# Patient Record
Sex: MALE | Race: WHITE | URBAN - METROPOLITAN AREA
[De-identification: names, ages, dates, MRNs, and addresses within clinical notes are randomized per-mention and may not be internally consistent; named-entity substitution may affect disease eponyms.]

---

## 2017-01-30 ENCOUNTER — TRANSFERRED RECORDS (OUTPATIENT)
Dept: HEALTH INFORMATION MANAGEMENT | Facility: CLINIC | Age: 57
End: 2017-01-30

## 2017-03-24 ENCOUNTER — TELEPHONE (OUTPATIENT)
Dept: TRANSPLANT | Facility: CLINIC | Age: 57
End: 2017-03-24

## 2017-03-24 NOTE — TELEPHONE ENCOUNTER
Rd calling with request for small refill of mycophenolate and rapamune, from [facility] pharmacy in [place].   He travelled there and forgot these medications.  Call made to [facility] pharmacy at [address] [place] [address].   For mycophenolate 250mg BID (5 tabs) and rapamune 3mg (6 tabs)

## 2017-08-18 ENCOUNTER — TELEPHONE (OUTPATIENT)
Dept: TRANSPLANT | Facility: CLINIC | Age: 57
End: 2017-08-18

## 2017-08-18 NOTE — TELEPHONE ENCOUNTER
Per :   Pt hospitalized in [place], [place], with dehydration, diarrhea.  Per , pt would like to officially transfer his care here for his transplant f/u.  Requesting to be seen in clinic for f/u post hospitalization.

## 2017-08-18 NOTE — TELEPHONE ENCOUNTER
Pt sent email requesting to come out to be seen, now out of the hospital.  He is asking to see both  and , same day.  This request was sent to 's , who reports that  is away until after Labor day.    Plan request appt with , inform 's  of 's appt with pt.

## 2017-08-24 DIAGNOSIS — Z94.4 HISTORY OF LIVER TRANSPLANT (H): Primary | ICD-10-CM

## 2017-08-24 NOTE — TELEPHONE ENCOUNTER
Per pt please send prescription for azathioprine to:    100mg  Take 1 tab daily,   Discontinue cellcept when starts azathioprine.    [facility]  [address] [place] [address]  p: [phone]

## 2017-08-24 NOTE — TELEPHONE ENCOUNTER
Per  re: pt's immunosuppression.   Pt hospitalized with diarrhea, dehydration.  Plan for pt to discontinue cellcept, start azathioprine 100mg daily.  Continue on same dose of sirolimus.

## 2017-08-25 RX ORDER — AZATHIOPRINE 100 MG/1
100 TABLET ORAL DAILY
Qty: 90 TABLET | Refills: 3
Start: 2017-08-25 | End: 2018-08-14

## 2017-08-31 DIAGNOSIS — Z94.4 LIVER REPLACED BY TRANSPLANT (H): Primary | ICD-10-CM

## 2017-09-11 DIAGNOSIS — Z94.4 LIVER REPLACED BY TRANSPLANT (H): ICD-10-CM

## 2017-09-11 RX ORDER — SIROLIMUS 1 MG/1
3 TABLET, FILM COATED ORAL DAILY
Qty: 270 TABLET | Refills: 3 | Status: SHIPPED | OUTPATIENT
Start: 2017-09-11 | End: 2017-09-29

## 2017-09-29 ENCOUNTER — TELEPHONE (OUTPATIENT)
Dept: TRANSPLANT | Facility: CLINIC | Age: 57
End: 2017-09-29

## 2017-09-29 DIAGNOSIS — Z94.4 LIVER REPLACED BY TRANSPLANT (H): ICD-10-CM

## 2017-09-29 RX ORDER — SIROLIMUS 1 MG/1
3 TABLET, FILM COATED ORAL DAILY
Qty: 270 TABLET | Refills: 3 | Status: SHIPPED | OUTPATIENT
Start: 2017-09-29 | End: 2018-09-27

## 2017-09-29 NOTE — LETTER
OUTPATIENT OR TRANSITIONAL CARE   LABORATORY TEST ORDER     Patient Name: Scar Landers  Transplant Date: 11/22/2005   YOB: 1960  Issue Date: September 29, 2017   [facility] MR#: [id number]  Expiration Date:   September 29, 2018        Diagnoses: [x]      Liver Transplant (ICD-10 Z94.4)    [x]      Long term use of medications (ICD-10 Z79.899)     Please fax results to [phone]    Frequency: every 2 months and PRN         [x]      CBC with Platelets   [x]      Basic Metabolic Panel (Sodium, Potassium, Chloride, CO2, Creatinine, Urea Nitrogen, Glucose, Calcium)  [x]      Hepatic panel (Albumin, Alk Phos, ALT, AST, LDH, Direct and Total Bili)  [x]      Tacrolimus drug level  - 24 hour trough, please document time of last dose    Other Frequency: annually    [x]      Fasting lipid panel  [x]      Random urine protein  [x]      Urinalysis with microscopic    If you have any questions, please call [facility]- [phone] or [phone], Fax- [phone].  .

## 2017-09-29 NOTE — TELEPHONE ENCOUNTER
Pt contacting about not having refill of sirolimus, ordered 9/11,  Discovered that pt's mail order pharmacy is now [facility], which merged with [facility] (previous script there).  Each company uses different computer system.    Called and resent prescription to [facility].    Pt requesting updated lab orders.

## 2017-10-04 ENCOUNTER — TELEPHONE (OUTPATIENT)
Dept: GASTROENTEROLOGY | Facility: CLINIC | Age: 57
End: 2017-10-04

## 2017-10-04 NOTE — TELEPHONE ENCOUNTER
Left message for pt reminding them of upcoming appointment.  Instructed pt to bring updated medications list.  Patient instructed to arrive early for check-in  Dio Bianchi CMA

## 2017-10-06 ENCOUNTER — OFFICE VISIT (OUTPATIENT)
Dept: TRANSPLANT | Facility: CLINIC | Age: 57
End: 2017-10-06
Attending: TRANSPLANT SURGERY
Payer: COMMERCIAL

## 2017-10-06 ENCOUNTER — OFFICE VISIT (OUTPATIENT)
Dept: GASTROENTEROLOGY | Facility: CLINIC | Age: 57
End: 2017-10-06
Attending: INTERNAL MEDICINE
Payer: COMMERCIAL

## 2017-10-06 VITALS
DIASTOLIC BLOOD PRESSURE: 75 MMHG | SYSTOLIC BLOOD PRESSURE: 110 MMHG | HEART RATE: 65 BPM | HEIGHT: 72 IN | BODY MASS INDEX: 22.17 KG/M2 | TEMPERATURE: 98.3 F | OXYGEN SATURATION: 100 % | RESPIRATION RATE: 16 BRPM | WEIGHT: 163.7 LBS

## 2017-10-06 VITALS
WEIGHT: 163.7 LBS | SYSTOLIC BLOOD PRESSURE: 110 MMHG | HEIGHT: 72 IN | BODY MASS INDEX: 22.17 KG/M2 | OXYGEN SATURATION: 100 % | DIASTOLIC BLOOD PRESSURE: 75 MMHG | TEMPERATURE: 97.6 F | HEART RATE: 65 BPM

## 2017-10-06 DIAGNOSIS — Z94.4 LIVER REPLACED BY TRANSPLANT (H): Primary | ICD-10-CM

## 2017-10-06 DIAGNOSIS — Z94.4 LIVER REPLACED BY TRANSPLANT (H): ICD-10-CM

## 2017-10-06 LAB
ALBUMIN SERPL-MCNC: 3.4 G/DL (ref 3.4–5)
ALBUMIN UR-MCNC: 30 MG/DL
ALP SERPL-CCNC: 120 U/L (ref 40–150)
ALT SERPL W P-5'-P-CCNC: 28 U/L (ref 0–70)
ANION GAP SERPL CALCULATED.3IONS-SCNC: 4 MMOL/L (ref 3–14)
APPEARANCE UR: ABNORMAL
AST SERPL W P-5'-P-CCNC: 25 U/L (ref 0–45)
BILIRUB DIRECT SERPL-MCNC: <0.1 MG/DL (ref 0–0.2)
BILIRUB SERPL-MCNC: 0.5 MG/DL (ref 0.2–1.3)
BILIRUB UR QL STRIP: NEGATIVE
BUN SERPL-MCNC: 24 MG/DL (ref 7–30)
CALCIUM SERPL-MCNC: 8.1 MG/DL (ref 8.5–10.1)
CHLORIDE SERPL-SCNC: 106 MMOL/L (ref 94–109)
CHOLEST SERPL-MCNC: 143 MG/DL
CO2 SERPL-SCNC: 29 MMOL/L (ref 20–32)
COLOR UR AUTO: YELLOW
CREAT SERPL-MCNC: 1.5 MG/DL (ref 0.66–1.25)
CREAT UR-MCNC: 170 MG/DL
ERYTHROCYTE [DISTWIDTH] IN BLOOD BY AUTOMATED COUNT: 15.9 % (ref 10–15)
GFR SERPL CREATININE-BSD FRML MDRD: 48 ML/MIN/1.7M2
GLUCOSE SERPL-MCNC: 91 MG/DL (ref 70–99)
GLUCOSE UR STRIP-MCNC: NEGATIVE MG/DL
HCT VFR BLD AUTO: 35 % (ref 40–53)
HDLC SERPL-MCNC: 64 MG/DL
HGB BLD-MCNC: 11.1 G/DL (ref 13.3–17.7)
HGB UR QL STRIP: NEGATIVE
HYALINE CASTS #/AREA URNS LPF: 1 /LPF (ref 0–2)
KETONES UR STRIP-MCNC: NEGATIVE MG/DL
LDLC SERPL CALC-MCNC: 53 MG/DL
LEUKOCYTE ESTERASE UR QL STRIP: NEGATIVE
MCH RBC QN AUTO: 28.2 PG (ref 26.5–33)
MCHC RBC AUTO-ENTMCNC: 31.7 G/DL (ref 31.5–36.5)
MCV RBC AUTO: 89 FL (ref 78–100)
MUCOUS THREADS #/AREA URNS LPF: PRESENT /LPF
NITRATE UR QL: NEGATIVE
NONHDLC SERPL-MCNC: 79 MG/DL
PH UR STRIP: 5 PH (ref 5–7)
PLATELET # BLD AUTO: 279 10E9/L (ref 150–450)
POTASSIUM SERPL-SCNC: 4 MMOL/L (ref 3.4–5.3)
PROT SERPL-MCNC: 7 G/DL (ref 6.8–8.8)
PROT UR-MCNC: 0.48 G/L
PROT/CREAT 24H UR: 0.28 G/G CR (ref 0–0.2)
RBC # BLD AUTO: 3.94 10E12/L (ref 4.4–5.9)
RBC #/AREA URNS AUTO: 1 /HPF (ref 0–2)
SIROLIMUS BLD-MCNC: 5.2 UG/L (ref 5–15)
SODIUM SERPL-SCNC: 139 MMOL/L (ref 133–144)
SOURCE: ABNORMAL
SP GR UR STRIP: 1.02 (ref 1–1.03)
TME LAST DOSE: NORMAL H
TRIGL SERPL-MCNC: 130 MG/DL
UROBILINOGEN UR STRIP-MCNC: 0 MG/DL (ref 0–2)
WBC # BLD AUTO: 7.1 10E9/L (ref 4–11)
WBC #/AREA URNS AUTO: <1 /HPF (ref 0–2)

## 2017-10-06 PROCEDURE — G0008 ADMIN INFLUENZA VIRUS VAC: HCPCS | Mod: ZF

## 2017-10-06 PROCEDURE — 80061 LIPID PANEL: CPT | Performed by: INTERNAL MEDICINE

## 2017-10-06 PROCEDURE — 84156 ASSAY OF PROTEIN URINE: CPT | Performed by: INTERNAL MEDICINE

## 2017-10-06 PROCEDURE — 90686 IIV4 VACC NO PRSV 0.5 ML IM: CPT | Mod: ZF | Performed by: TRANSPLANT SURGERY

## 2017-10-06 PROCEDURE — 99211 OFF/OP EST MAY X REQ PHY/QHP: CPT | Mod: 27

## 2017-10-06 PROCEDURE — 99212 OFFICE O/P EST SF 10 MIN: CPT | Mod: ZF

## 2017-10-06 PROCEDURE — 85027 COMPLETE CBC AUTOMATED: CPT | Performed by: INTERNAL MEDICINE

## 2017-10-06 PROCEDURE — 36415 COLL VENOUS BLD VENIPUNCTURE: CPT | Performed by: INTERNAL MEDICINE

## 2017-10-06 PROCEDURE — 80048 BASIC METABOLIC PNL TOTAL CA: CPT | Performed by: INTERNAL MEDICINE

## 2017-10-06 PROCEDURE — 80076 HEPATIC FUNCTION PANEL: CPT | Performed by: INTERNAL MEDICINE

## 2017-10-06 PROCEDURE — 80195 ASSAY OF SIROLIMUS: CPT | Performed by: INTERNAL MEDICINE

## 2017-10-06 PROCEDURE — 81001 URINALYSIS AUTO W/SCOPE: CPT | Performed by: INTERNAL MEDICINE

## 2017-10-06 PROCEDURE — 25000128 H RX IP 250 OP 636: Mod: ZF | Performed by: TRANSPLANT SURGERY

## 2017-10-06 RX ADMIN — INFLUENZA A VIRUS A/MICHIGAN/45/2015 X-275 (H1N1) ANTIGEN (FORMALDEHYDE INACTIVATED), INFLUENZA A VIRUS A/HONG KONG/4801/2014 X-263B (H3N2) ANTIGEN (FORMALDEHYDE INACTIVATED), INFLUENZA B VIRUS B/PHUKET/3073/2013 ANTIGEN (FORMALDEHYDE INACTIVATED), AND INFLUENZA B VIRUS B/BRISBANE/60/2008 ANTIGEN (FORMALDEHYDE INACTIVATED) 0.5 ML: 15; 15; 15; 15 INJECTION, SUSPENSION INTRAMUSCULAR at 12:55

## 2017-10-06 ASSESSMENT — PAIN SCALES - GENERAL: PAINLEVEL: NO PAIN (0)

## 2017-10-06 NOTE — MR AVS SNAPSHOT
After Visit Summary   10/6/2017    Scar Landers    MRN: [id number]           Patient Information     Date Of Birth          1960        Visit Information        Provider Department      10/6/2017 11:45 AM Juarez Ramos MD [facility] Hepatology        Today's Diagnoses     Liver replaced by transplant (H)    -  1       Follow-ups after your visit        Follow-up notes from your care team     Return if symptoms worsen or fail to improve.      Who to contact     If you have questions or need follow up information about today's clinic visit or your schedule please contact [facility] HEPATOLOG[facility] directly at [phone].  Normal or non-critical lab and imaging results will be communicated to you by MyChart, letter or phone within 4 business days after the clinic has received the results. If you do not hear from us within 7 days, please contact the clinic through [employer] or phone. If you have a critical or abnormal lab result, we will notify you by phone as soon as possible.  Submit refill requests through edulio or call your pharmacy and they will forward the refill request to us. Please allow 3 business days for your refill to be completed.          Additional Information About Your Visit        MyChart Information     edulio gives you secure access to your electronic health record. If you see a primary care provider, you can also send messages to your care team and make appointments. If you have questions, please call your primary care clinic.  If you do not have a primary care provider, please call [phone] and they will assist you.        Care EveryWhere ID     This is your Care EveryWhere ID. This could be used by other organizations to access your [facility] medical records  [id number]        Your Vitals Were     Pulse Temperature Height Pulse Oximetry BMI (Body Mass Index)       65 97.6  F (36.4  C) (Oral) 1.829 m (6') 100% 22.2 kg/m2        Blood Pressure from Last 3 Encounters:   10/06/17  110/75   10/06/17 110/75   05/09/14 119/76    Weight from Last 3 Encounters:   10/06/17 74.3 kg (163 lb 11.2 oz)   10/06/17 74.3 kg (163 lb 11.2 oz)   05/09/14 74.8 kg (164 lb 14.4 oz)              Today, you had the following     No orders found for display       Primary Care Provider Office Phone # Fax #    Frank CHUN Uriel [phone]       TRI DOLAN [address] [facility] [address]  [facility] [address]        Equal Access to Services     [facility]: Hadii aad ku hadasho Soomaali, waaxda luqadaha, qaybta kaalmada adeegyada, waxpiper saucedoin hayaan adenestor neff . So [facility] [phone].    ATENCIÓN: Si habla español, tiene a lara disposición servicios gratuitos de asistencia lingüística. Ll[facility] [phone].    We comply with applicable federal civil rights laws and [place] laws. We do not discriminate on the basis of race, color, national origin, age, disability, sex, sexual orientation, or gender identity.            Thank you!     Thank you for choosing [facility] HEPATOLOG[facility]  for your care. Our goal is always to provide you with excellent care. Hearing back from our patients is one way we can continue to improve our services. Please take a few minutes to complete the written survey that you may receive in the mail after your visit with us. Thank you!             Your Updated Medication List - Protect others around you: Learn how to safely use, store and throw away your medicines at www.disposemymeds.org.          This list is accurate as of: 10/6/17 11:59 PM.  Always use your most recent med list.                   Brand Name Dispense Instructions for use Diagnosis    allopurinol 100 MG tablet    ZYLOPRIM    180 tablet    Take 2 tablets (200 mg) by mouth daily    Acute gouty arthritis, Status post liver transplantation (H)       atorvastatin 10 MG tablet    LIPITOR     Take 10 mg by mouth daily Take 1/2 tab daily        azaTHIOprine 100 MG Tabs     90 tablet    Take 100 mg by mouth daily     History of liver transplant (H)       colchicine 0.6 MG tablet      Take 2 tablets by mouth daily        losartan-hydrochlorothiazide 50-12.5 MG per tablet    HYZAAR     Take 1 tablet by mouth daily        sirolimus 1 MG tablet    GENERIC EQUIVALENT    270 tablet    Take 3 tablets (3 mg) by mouth daily Express ID # [id number]    Liver replaced by transplant (H)       STRESS TAB NF PO      Take by mouth daily as needed

## 2017-10-06 NOTE — NURSING NOTE
Chief Complaint   Patient presents with     Liver Transplant       Initial /75  Pulse 65  Temp 98.3  F (36.8  C) (Oral)  Resp 16  Ht 1.829 m (6')  Wt 74.3 kg (163 lb 11.2 oz)  SpO2 100%  BMI 22.2 kg/m2 Estimated body mass index is 22.2 kg/(m^2) as calculated from the following:    Height as of this encounter: 1.829 m (6').    Weight as of this encounter: 74.3 kg (163 lb 11.2 oz).

## 2017-10-06 NOTE — MR AVS SNAPSHOT
After Visit Summary   10/6/2017    Scar Landers    MRN: [id number]           Patient Information     Date Of Birth          1960        Visit Information        Provider Department      10/6/2017 11:15 AM Larry Vigil MD [facility] Solid Organ Transplant        Today's Diagnoses     Liver replaced by transplant (H)    -  1       Follow-ups after your visit        Who to contact     If you have questions or need follow up information about today's clinic visit or your schedule please contact [facility] SOLID ORGAN TRANSPLANT directly at [phone].  Normal or non-critical lab and imaging results will be communicated to you by [employer], letter or phone within 4 business days after the clinic has received the results. If you do not hear from us within 7 days, please contact the clinic through [employer] or phone. If you have a critical or abnormal lab result, we will notify you by phone as soon as possible.  Submit refill requests through [employer] or call your pharmacy and they will forward the refill request to us. Please allow 3 business days for your refill to be completed.          Additional Information About Your Visit        MyChart Information     [employer] gives you secure access to your electronic health record. If you see a primary care provider, you can also send messages to your care team and make appointments. If you have questions, please call your primary care clinic.  If you do not have a primary care provider, please call [phone] and they will assist you.        Care EveryWhere ID     This is your Care EveryWhere ID. This could be used by other organizations to access your [facility] medical records  [id number]        Your Vitals Were     Pulse Temperature Respirations Height Pulse Oximetry BMI (Body Mass Index)    65 98.3  F (36.8  C) (Oral) 16 1.829 m (6') 100% 22.2 kg/m2       Blood Pressure from Last 3 Encounters:   10/06/17 110/75   10/06/17 110/75   05/09/14 119/76     Weight from Last 3 Encounters:   10/06/17 74.3 kg (163 lb 11.2 oz)   10/06/17 74.3 kg (163 lb 11.2 oz)   05/09/14 74.8 kg (164 lb 14.4 oz)              Today, you had the following     No orders found for display       Primary Care Provider Office Phone # Fax #    Frank CHUN Uriel [phone]       TRI DOLAN [address] [facility] [address]  [facility] [address]        Equal Access to Services     [facility]: Hadii aad ku hadasho Soomaali, waaxda luqadaha, qaybta kaalmada adeegyada, waxay idiin hayaan adeeg kharainge neff . So [facility] [phone].    ATENCIÓN: Si habla español, tiene a lara disposición servicios gratuitos de asistencia lingüística. Ll[facility] [phone].    We comply with applicable federal civil rights laws and [place] laws. We do not discriminate on the basis of race, color, national origin, age, disability, sex, sexual orientation, or gender identity.            Thank you!     Thank you for choosing [facility] SOLID ORGAN TRANSPLANT  for your care. Our goal is always to provide you with excellent care. Hearing back from our patients is one way we can continue to improve our services. Please take a few minutes to complete the written survey that you may receive in the mail after your visit with us. Thank you!             Your Updated Medication List - Protect others around you: Learn how to safely use, store and throw away your medicines at www.disposemymeds.org.          This list is accurate as of: 10/6/17 11:59 PM.  Always use your most recent med list.                   Brand Name Dispense Instructions for use Diagnosis    allopurinol 100 MG tablet    ZYLOPRIM    180 tablet    Take 2 tablets (200 mg) by mouth daily    Acute gouty arthritis, Status post liver transplantation (H)       atorvastatin 10 MG tablet    LIPITOR     Take 10 mg by mouth daily Take 1/2 tab daily        azaTHIOprine 100 MG Tabs     90 tablet    Take 100 mg by mouth daily    History of liver transplant (H)        colchicine 0.6 MG tablet      Take 2 tablets by mouth daily        losartan-hydrochlorothiazide 50-12.5 MG per tablet    HYZAAR     Take 1 tablet by mouth daily        sirolimus 1 MG tablet    GENERIC EQUIVALENT    270 tablet    Take 3 tablets (3 mg) by mouth daily Express ID # [id number]    Liver replaced by transplant (H)       STRESS TAB NF PO      Take by mouth daily as needed

## 2017-10-06 NOTE — LETTER
10/6/2017      RE: Scar Landers  [address] [facility] [address]       I had the pleasure of seeing Rd Landers for followup in the Liver Transplantation Clinic at the [facility] on 10/06/2017.  Mr. Sun returns for followup status post liver transplantation for primary sclerosing cholangitis at the [facility].      He is doing quite well at this point in time.  He denies any abdominal pain, itching or skin rash or fatigue.  He denies any increased abdominal girth or lower extremity edema.  He denies any fevers or chills, cough or shortness of breath.  He denies any nausea or vomiting, diarrhea or constipation.  His appetite has been good, and his weight has been stable.  There have been no other new events since he was last seen.       Current Outpatient Prescriptions   Medication     sirolimus (GENERIC EQUIVALENT) 1 MG tablet     azaTHIOprine 100 MG TABS     allopurinol (ZYLOPRIM) 100 MG tablet     losartan-hydrochlorothiazide (HYZAAR) 50-12.5 MG per tablet     colchicine 0.6 MG tablet     atorvastatin (LIPITOR) 10 MG tablet     Multiple Vitamins-Minerals (STRESS TAB NF PO)     No current facility-administered medications for this visit.      B/P: 110/75, T: 97.6, P: 65, R: Data Unavailable    HEENT exam shows no scleral icterus and no temporal muscle wasting.  His chest is clear.  His abdominal exam shows no increase in girth.  No masses or tenderness to palpation are present.  His liver is 10 cm in span without left lobe enlargement.  No spleen tip is palpable, and extremity exam shows no edema.  Skin exam shows no suspicious lesions.  Neurologic exam is nonfocal.      His most recent laboratory tests show his white count is 7.1, hemoglobin is 11.1, platelets are 279,000.  His MCV is 89.  Sodium 139, potassium 4.0, BUN is 24, creatinine 1.5.  AST is 25, ALT is 28, alkaline phosphatase is 120, albumin is 3.4, total protein of 7.0, total bilirubin is 0.5.       My impression is that Mr. Landers is doing very well status post liver transplantation.  He will be updated in terms of his vaccines.  He is up-to-date with regard to cancer screening. I will not be making any change to his medical regimen.  I will to see him back in the clinic again in one year.      Thank you very much for allowing me to participate in the care of this patient.  If you have any questions regarding my recommendations, please do not hesitate to contact me.       Juarez Ramos MD      Professor of Medicine  [employer]      Executive Medical Director, Solid Organ Transplant Program  [facility]

## 2017-10-06 NOTE — LETTER
10/6/2017       RE: Scar Landers  [address] [facility] [address]     Dear Colleague,    Thank you for referring your patient, Scar Landers, to the [facility] SOLID ORGAN TRANSPLANT at [facility]. Please see a copy of my visit note below.    No specific issues. Thinking of transferring care to   from [place].  Have known Mr Landers for many years.  Current Outpatient Prescriptions   Medication     sirolimus (GENERIC EQUIVALENT) 1 MG tablet     azaTHIOprine 100 MG TABS     allopurinol (ZYLOPRIM) 100 MG tablet     losartan-hydrochlorothiazide (HYZAAR) 50-12.5 MG per tablet     colchicine 0.6 MG tablet     atorvastatin (LIPITOR) 10 MG tablet     Multiple Vitamins-Minerals (STRESS TAB NF PO)     No current facility-administered medications for this visit.      No surgical issues at this time.  To see Dr. Ramos.    Again, thank you for allowing me to participate in the care of your patient.      Sincerely,    Larry Vigil MD

## 2017-10-06 NOTE — NURSING NOTE
Chief Complaint   Patient presents with     RECHECK     Post Liver TXP   Pt roomed, vitals, meds, and allergies reviewed with pt. Pt ready for provider.  Dio Bianchi, CMA

## 2017-10-16 NOTE — PROGRESS NOTES
I had the pleasure of seeing Rd Landers for followup in the Liver Transplantation Clinic at the [facility] on 10/06/2017.  Mr. Sun returns for followup status post liver transplantation for primary sclerosing cholangitis at the [facility].      He is doing quite well at this point in time.  He denies any abdominal pain, itching or skin rash or fatigue.  He denies any increased abdominal girth or lower extremity edema.  He denies any fevers or chills, cough or shortness of breath.  He denies any nausea or vomiting, diarrhea or constipation.  His appetite has been good, and his weight has been stable.  There have been no other new events since he was last seen.       Current Outpatient Prescriptions   Medication     sirolimus (GENERIC EQUIVALENT) 1 MG tablet     azaTHIOprine 100 MG TABS     allopurinol (ZYLOPRIM) 100 MG tablet     losartan-hydrochlorothiazide (HYZAAR) 50-12.5 MG per tablet     colchicine 0.6 MG tablet     atorvastatin (LIPITOR) 10 MG tablet     Multiple Vitamins-Minerals (STRESS TAB NF PO)     No current facility-administered medications for this visit.      B/P: 110/75, T: 97.6, P: 65, R: Data Unavailable    HEENT exam shows no scleral icterus and no temporal muscle wasting.  His chest is clear.  His abdominal exam shows no increase in girth.  No masses or tenderness to palpation are present.  His liver is 10 cm in span without left lobe enlargement.  No spleen tip is palpable, and extremity exam shows no edema.  Skin exam shows no suspicious lesions.  Neurologic exam is nonfocal.      His most recent laboratory tests show his white count is 7.1, hemoglobin is 11.1, platelets are 279,000.  His MCV is 89.  Sodium 139, potassium 4.0, BUN is 24, creatinine 1.5.  AST is 25, ALT is 28, alkaline phosphatase is 120, albumin is 3.4, total protein of 7.0, total bilirubin is 0.5.      My impression is that Mr. Landers is doing very well status post liver  transplantation.  He will be updated in terms of his vaccines.  He is up-to-date with regard to cancer screening. I will not be making any change to his medical regimen.  I will to see him back in the clinic again in one year.      Thank you very much for allowing me to participate in the care of this patient.  If you have any questions regarding my recommendations, please do not hesitate to contact me.       Juarez Ramos MD      Professor of Medicine  [employer]      Executive Medical Director, Solid Organ Transplant Program  [facility]

## 2017-10-30 RX ORDER — MYCOPHENOLATE MOFETIL 250 MG/1
CAPSULE ORAL
Qty: 180 CAPSULE | Refills: 3 | OUTPATIENT
Start: 2017-10-30

## 2017-11-02 NOTE — PROGRESS NOTES
No specific issues. Thinking of transferring care to   from [place].  Have known Mr Landers for many years.  Current Outpatient Prescriptions   Medication     sirolimus (GENERIC EQUIVALENT) 1 MG tablet     azaTHIOprine 100 MG TABS     allopurinol (ZYLOPRIM) 100 MG tablet     losartan-hydrochlorothiazide (HYZAAR) 50-12.5 MG per tablet     colchicine 0.6 MG tablet     atorvastatin (LIPITOR) 10 MG tablet     Multiple Vitamins-Minerals (STRESS TAB NF PO)     No current facility-administered medications for this visit.      No surgical issues at this time.  To see Dr. Ramos.

## 2017-12-28 ENCOUNTER — DOCUMENTATION ONLY (OUTPATIENT)
Dept: TRANSPLANT | Facility: CLINIC | Age: 57
End: 2017-12-28

## 2018-08-14 DIAGNOSIS — Z94.4 HISTORY OF LIVER TRANSPLANT (H): ICD-10-CM

## 2018-08-15 RX ORDER — AZATHIOPRINE 100 MG/1
100 TABLET ORAL DAILY
Qty: 90 TABLET | Refills: 3 | Status: SHIPPED | OUTPATIENT
Start: 2018-08-15 | End: 2018-08-15

## 2018-08-15 RX ORDER — AZATHIOPRINE 100 MG/1
100 TABLET ORAL DAILY
Qty: 90 TABLET | Refills: 3 | Status: SHIPPED | OUTPATIENT
Start: 2018-08-15 | End: 2018-11-26

## 2018-09-27 DIAGNOSIS — Z94.4 LIVER REPLACED BY TRANSPLANT (H): ICD-10-CM

## 2018-09-27 RX ORDER — SIROLIMUS 1 MG/1
3 TABLET, FILM COATED ORAL DAILY
Qty: 270 TABLET | Refills: 3 | Status: SHIPPED | OUTPATIENT
Start: 2018-09-27 | End: 2019-06-17

## 2018-11-26 DIAGNOSIS — Z94.4 HISTORY OF LIVER TRANSPLANT (H): ICD-10-CM

## 2018-11-26 RX ORDER — AZATHIOPRINE 100 MG/1
100 TABLET ORAL DAILY
Qty: 90 TABLET | Refills: 3 | Status: SHIPPED | OUTPATIENT
Start: 2018-11-26 | End: 2019-01-11 | Stop reason: ALTCHOICE

## 2019-01-11 DIAGNOSIS — Z94.4 LIVER TRANSPLANTED (H): Primary | ICD-10-CM

## 2019-01-11 RX ORDER — AZATHIOPRINE 50 MG/1
100 TABLET ORAL DAILY
Qty: 180 TABLET | Refills: 3 | Status: SHIPPED | OUTPATIENT
Start: 2019-01-11 | End: 2020-03-16

## 2019-06-17 DIAGNOSIS — Z94.4 LIVER REPLACED BY TRANSPLANT (H): ICD-10-CM

## 2019-06-18 RX ORDER — SIROLIMUS 1 MG/1
3 TABLET, FILM COATED ORAL DAILY
Qty: 270 TABLET | Refills: 3 | Status: SHIPPED | OUTPATIENT
Start: 2019-06-18

## 2019-06-18 NOTE — PROGRESS NOTES
Requesting refill of sirolimus, message sent to pt reminding pt to make an appt to be seen by  this year.

## 2019-12-23 DIAGNOSIS — Z94.4 LIVER REPLACED BY TRANSPLANT (H): ICD-10-CM

## 2019-12-23 DIAGNOSIS — Z13.220 LIPID SCREENING: ICD-10-CM

## 2020-03-01 ENCOUNTER — HEALTH MAINTENANCE LETTER (OUTPATIENT)
Age: 60
End: 2020-03-01

## 2020-03-13 DIAGNOSIS — Z94.4 LIVER TRANSPLANTED (H): ICD-10-CM

## 2020-03-16 RX ORDER — AZATHIOPRINE 50 MG/1
100 TABLET ORAL DAILY
Qty: 180 TABLET | Refills: 3 | Status: SHIPPED | OUTPATIENT
Start: 2020-03-16 | End: 2021-03-16

## 2020-07-09 ENCOUNTER — TELEPHONE (OUTPATIENT)
Dept: TRANSPLANT | Facility: CLINIC | Age: 60
End: 2020-07-09

## 2020-07-09 NOTE — TELEPHONE ENCOUNTER
Annual chart review.     Rd was seen by Dr. Ramos last on 10/6/2017. There is no documented dermatology visit for annual skin check, Rd is due for his annual lipid panel and urine studies. Last routine transplant labs in our system are from 10/27/2018.     Will send a [employer] message to Rd to ask if he is following at another health care system. If not, will discuss recommended post transplant health care appts and how I can assist in scheduling/ordering.

## 2020-12-14 ENCOUNTER — HEALTH MAINTENANCE LETTER (OUTPATIENT)
Age: 60
End: 2020-12-14

## 2020-12-29 DIAGNOSIS — Z13.220 LIPID SCREENING: ICD-10-CM

## 2020-12-29 DIAGNOSIS — Z94.4 LIVER REPLACED BY TRANSPLANT (H): ICD-10-CM

## 2021-03-07 DIAGNOSIS — Z94.4 LIVER TRANSPLANTED (H): ICD-10-CM

## 2021-03-09 ENCOUNTER — TELEPHONE (OUTPATIENT)
Dept: TRANSPLANT | Facility: CLINIC | Age: 61
End: 2021-03-09

## 2021-03-09 NOTE — TELEPHONE ENCOUNTER
Received a refill request for Azathioprine. Rd has not been seen by Dr. Ramos since 2017 and has not returned previous calls or messages regarding follow up. I left a message to call me on his home phone and cell phone today. Main office number provided.

## 2021-03-09 NOTE — TELEPHONE ENCOUNTER
Left a message for pt to call the tx office as he has not had labs since 2017 and refill came through for azathioprine.

## 2021-03-15 ENCOUNTER — TELEPHONE (OUTPATIENT)
Dept: TRANSPLANT | Facility: CLINIC | Age: 61
End: 2021-03-15

## 2021-03-15 NOTE — TELEPHONE ENCOUNTER
Attempted to reach Rd again to ask him who is managing his IS regimen since he has not been seen at our center for a number of years. No answer, left a message to call me back and main office number provided.

## 2021-03-16 ENCOUNTER — TELEPHONE (OUTPATIENT)
Dept: TRANSPLANT | Facility: CLINIC | Age: 61
End: 2021-03-16

## 2021-03-16 RX ORDER — AZATHIOPRINE 50 MG/1
TABLET ORAL
Qty: 60 TABLET | Refills: 0 | Status: SHIPPED | OUTPATIENT
Start: 2021-03-16

## 2021-03-16 NOTE — TELEPHONE ENCOUNTER
Rd returned my call and he is following locally with Dr. Cesar Walls and will not be formally scheduling follow up visits with Dr. Ramos. He states that Dr. Walls updates Dr. Ramos on his health status. I did ask that Rd schedule a follow up with Dr. Ramos if he would like his RX's filled by us. Rd said RX will be taken care of by Dr. Walls as well.     Rd also asked if I had any information about the recent [facility] study that stated those on certain immunosuppression medications may be less likely to form antibodies if receiving mRNA COVID vaccines. I informed him that I am aware of the study but have not received any specific information from our providers and that our transplant team and ID specialists recommend any of the 3 vaccines. Message sent to Dr. Ramos and Dr. Vigil per pt request.

## 2021-04-06 ENCOUNTER — TELEPHONE (OUTPATIENT)
Dept: TRANSPLANT | Facility: CLINIC | Age: 61
End: 2021-04-06

## 2021-04-06 NOTE — TELEPHONE ENCOUNTER
"Rd had asked previously about antibody production following the COVID vaccine and our providers opinions on the issue. Message was forwarded to Dr. Vigil who responded with the following,    \"we don t know the impact of non CNI IS.      Would recommend that he get a vaccine and check for antibodies 1-2 months after sec vaccine.  If min or no ab response talk to someone out there about booster dose.\"    Attempted to reach Rd with the update, no answer, left a message for him to call me and main office number provided. [employer] message sent as well.     "

## 2021-04-11 DIAGNOSIS — Z94.4 LIVER TRANSPLANTED (H): ICD-10-CM

## 2021-04-12 ENCOUNTER — TELEPHONE (OUTPATIENT)
Dept: TRANSPLANT | Facility: CLINIC | Age: 61
End: 2021-04-12

## 2021-04-12 RX ORDER — AZATHIOPRINE 50 MG/1
100 TABLET ORAL DAILY
Qty: 180 TABLET | Refills: 3 | OUTPATIENT
Start: 2021-04-12

## 2021-04-12 NOTE — TELEPHONE ENCOUNTER
Spoke to the pharmacist and informed him that pt is no longer followed at our clinic and that all tx related refills go to Dr. Guerrero in [place].

## 2021-04-17 ENCOUNTER — HEALTH MAINTENANCE LETTER (OUTPATIENT)
Age: 61
End: 2021-04-17

## 2021-04-28 DIAGNOSIS — Z94.4 LIVER TRANSPLANTED (H): ICD-10-CM

## 2021-04-28 RX ORDER — AZATHIOPRINE 50 MG/1
TABLET ORAL
Qty: 60 TABLET | Refills: 0 | OUTPATIENT
Start: 2021-04-28

## 2021-10-02 ENCOUNTER — HEALTH MAINTENANCE LETTER (OUTPATIENT)
Age: 61
End: 2021-10-02

## 2021-12-27 ENCOUNTER — TELEPHONE (OUTPATIENT)
Dept: TRANSPLANT | Facility: CLINIC | Age: 61
End: 2021-12-27
Payer: COMMERCIAL

## 2021-12-27 NOTE — TELEPHONE ENCOUNTER
Confirmed with Rd March 2021 that he is no longer receiving care at [facility]. He is currently being followed by Dr. Cesar Walls in [place]

## 2022-03-08 DIAGNOSIS — Z94.4 LIVER REPLACED BY TRANSPLANT (H): Primary | ICD-10-CM

## 2022-05-08 ENCOUNTER — HEALTH MAINTENANCE LETTER (OUTPATIENT)
Age: 62
End: 2022-05-08

## 2022-12-09 ENCOUNTER — TELEPHONE (OUTPATIENT)
Dept: TRANSPLANT | Facility: CLINIC | Age: 62
End: 2022-12-09

## 2022-12-09 NOTE — TELEPHONE ENCOUNTER
Rd is due for annual chart review. Confirmed with him on 3/16/2021 that he is followed locally by Dr Cesar Walls. Transplant episode updated to reflect we are no longer actively following.

## 2023-01-14 ENCOUNTER — HEALTH MAINTENANCE LETTER (OUTPATIENT)
Age: 63
End: 2023-01-14

## 2023-06-02 ENCOUNTER — HEALTH MAINTENANCE LETTER (OUTPATIENT)
Age: 63
End: 2023-06-02

## 2024-10-12 ENCOUNTER — NEW PATIENT (OUTPATIENT)
Dept: URBAN - METROPOLITAN AREA CLINIC 67 | Facility: CLINIC | Age: 64
End: 2024-10-12

## 2024-10-12 DIAGNOSIS — H43.392: ICD-10-CM

## 2024-10-12 DIAGNOSIS — H35.413: ICD-10-CM

## 2024-10-12 DIAGNOSIS — H33.011: ICD-10-CM

## 2024-10-12 DIAGNOSIS — H43.811: ICD-10-CM

## 2024-10-12 DIAGNOSIS — H25.13: ICD-10-CM

## 2024-10-12 DIAGNOSIS — H33.301: ICD-10-CM

## 2024-10-12 PROCEDURE — 92201 OPSCPY EXTND RTA DRAW UNI/BI: CPT

## 2024-10-12 PROCEDURE — 92250 FUNDUS PHOTOGRAPHY W/I&R: CPT | Mod: NC

## 2024-10-12 PROCEDURE — 92134 CPTRZ OPH DX IMG PST SGM RTA: CPT

## 2024-10-12 PROCEDURE — 99205 OFFICE O/P NEW HI 60 MIN: CPT | Mod: 57

## 2024-10-12 ASSESSMENT — VISUAL ACUITY
OD_CC: 20/25-2
OS_CC: 20/20

## 2024-10-12 ASSESSMENT — TONOMETRY
OD_IOP_MMHG: 14
OS_IOP_MMHG: 19

## 2024-10-13 ENCOUNTER — 1 DAY POST-OP (OUTPATIENT)
Dept: URBAN - METROPOLITAN AREA CLINIC 67 | Facility: CLINIC | Age: 64
End: 2024-10-13

## 2024-10-13 DIAGNOSIS — H35.413: ICD-10-CM

## 2024-10-13 DIAGNOSIS — H43.811: ICD-10-CM

## 2024-10-13 DIAGNOSIS — H25.13: ICD-10-CM

## 2024-10-13 DIAGNOSIS — H43.392: ICD-10-CM

## 2024-10-13 DIAGNOSIS — Z98.890: ICD-10-CM

## 2024-10-13 DIAGNOSIS — H33.301: ICD-10-CM

## 2024-10-13 DIAGNOSIS — H33.011: ICD-10-CM

## 2024-10-13 PROCEDURE — 99024 POSTOP FOLLOW-UP VISIT: CPT

## 2024-10-16 ENCOUNTER — POST-OP CHECK (OUTPATIENT)
Dept: URBAN - METROPOLITAN AREA CLINIC 67 | Facility: CLINIC | Age: 64
End: 2024-10-16

## 2024-10-16 DIAGNOSIS — H33.011: ICD-10-CM

## 2024-10-16 DIAGNOSIS — H43.811: ICD-10-CM

## 2024-10-16 DIAGNOSIS — H43.392: ICD-10-CM

## 2024-10-16 DIAGNOSIS — H33.301: ICD-10-CM

## 2024-10-16 DIAGNOSIS — H25.13: ICD-10-CM

## 2024-10-16 DIAGNOSIS — Z98.890: ICD-10-CM

## 2024-10-16 DIAGNOSIS — H35.413: ICD-10-CM

## 2024-10-16 PROCEDURE — 99024 POSTOP FOLLOW-UP VISIT: CPT

## 2024-10-16 ASSESSMENT — TONOMETRY: OD_IOP_MMHG: 13

## 2024-11-06 ENCOUNTER — 3 WEEK POST-OP (OUTPATIENT)
Dept: URBAN - METROPOLITAN AREA CLINIC 67 | Facility: CLINIC | Age: 64
End: 2024-11-06

## 2024-11-06 DIAGNOSIS — H35.413: ICD-10-CM

## 2024-11-06 DIAGNOSIS — H43.811: ICD-10-CM

## 2024-11-06 DIAGNOSIS — Z98.890: ICD-10-CM

## 2024-11-06 DIAGNOSIS — H43.392: ICD-10-CM

## 2024-11-06 DIAGNOSIS — H33.011: ICD-10-CM

## 2024-11-06 DIAGNOSIS — H33.301: ICD-10-CM

## 2024-11-06 DIAGNOSIS — H25.13: ICD-10-CM

## 2024-11-06 PROCEDURE — 99024 POSTOP FOLLOW-UP VISIT: CPT

## 2024-11-06 ASSESSMENT — VISUAL ACUITY
OD_CC: 20/30-1
OD_PH: 20/25-

## 2024-11-06 ASSESSMENT — TONOMETRY
OS_IOP_MMHG: 14
OD_IOP_MMHG: 12

## 2024-11-20 ENCOUNTER — POST-OP CHECK (OUTPATIENT)
Dept: URBAN - METROPOLITAN AREA CLINIC 91 | Facility: CLINIC | Age: 64
End: 2024-11-20

## 2024-11-20 DIAGNOSIS — Z98.890: ICD-10-CM

## 2024-11-20 DIAGNOSIS — H35.413: ICD-10-CM

## 2024-11-20 DIAGNOSIS — H33.301: ICD-10-CM

## 2024-11-20 DIAGNOSIS — H25.13: ICD-10-CM

## 2024-11-20 DIAGNOSIS — H33.011: ICD-10-CM

## 2024-11-20 DIAGNOSIS — H43.392: ICD-10-CM

## 2024-11-20 DIAGNOSIS — H43.811: ICD-10-CM

## 2024-11-20 PROCEDURE — 99024 POSTOP FOLLOW-UP VISIT: CPT

## 2024-11-20 PROCEDURE — 92134 CPTRZ OPH DX IMG PST SGM RTA: CPT | Mod: NC

## 2024-11-20 PROCEDURE — 92202 OPSCPY EXTND ON/MAC DRAW: CPT | Mod: NC

## 2024-11-20 ASSESSMENT — VISUAL ACUITY: OD_CC: 20/25-2

## 2024-11-20 ASSESSMENT — TONOMETRY: OD_IOP_MMHG: 19

## 2024-12-11 ENCOUNTER — POST-OP CHECK (OUTPATIENT)
Dept: URBAN - METROPOLITAN AREA CLINIC 67 | Facility: CLINIC | Age: 64
End: 2024-12-11

## 2024-12-11 DIAGNOSIS — Z98.890: ICD-10-CM

## 2024-12-11 DIAGNOSIS — H25.13: ICD-10-CM

## 2024-12-11 DIAGNOSIS — H35.413: ICD-10-CM

## 2024-12-11 DIAGNOSIS — H33.011: ICD-10-CM

## 2024-12-11 DIAGNOSIS — H43.811: ICD-10-CM

## 2024-12-11 DIAGNOSIS — H43.392: ICD-10-CM

## 2024-12-11 DIAGNOSIS — H33.301: ICD-10-CM

## 2024-12-11 PROCEDURE — 99024 POSTOP FOLLOW-UP VISIT: CPT

## 2024-12-11 PROCEDURE — 92134 CPTRZ OPH DX IMG PST SGM RTA: CPT

## 2024-12-11 ASSESSMENT — VISUAL ACUITY: OD_CC: 20/25-1

## 2024-12-11 ASSESSMENT — TONOMETRY: OD_IOP_MMHG: 13

## 2025-02-14 ENCOUNTER — FOLLOW UP (OUTPATIENT)
Dept: URBAN - METROPOLITAN AREA CLINIC 91 | Facility: CLINIC | Age: 65
End: 2025-02-14

## 2025-02-14 DIAGNOSIS — H35.413: ICD-10-CM

## 2025-02-14 DIAGNOSIS — Z98.890: ICD-10-CM

## 2025-02-14 DIAGNOSIS — H33.301: ICD-10-CM

## 2025-02-14 DIAGNOSIS — H25.13: ICD-10-CM

## 2025-02-14 DIAGNOSIS — H33.011: ICD-10-CM

## 2025-02-14 DIAGNOSIS — H43.392: ICD-10-CM

## 2025-02-14 DIAGNOSIS — H43.811: ICD-10-CM

## 2025-02-14 PROCEDURE — 92134 CPTRZ OPH DX IMG PST SGM RTA: CPT

## 2025-02-14 PROCEDURE — 92201 OPSCPY EXTND RTA DRAW UNI/BI: CPT

## 2025-02-14 PROCEDURE — 92014 COMPRE OPH EXAM EST PT 1/>: CPT

## 2025-02-14 ASSESSMENT — VISUAL ACUITY
OS_CC: 20/20
OD_PH: 20/30
OD_CC: 20/60

## 2025-02-14 ASSESSMENT — TONOMETRY
OD_IOP_MMHG: 17
OS_IOP_MMHG: 18

## 2025-06-06 ENCOUNTER — FOLLOW UP (OUTPATIENT)
Dept: URBAN - METROPOLITAN AREA CLINIC 91 | Facility: CLINIC | Age: 65
End: 2025-06-06

## 2025-06-06 DIAGNOSIS — H33.011: ICD-10-CM

## 2025-06-06 DIAGNOSIS — H43.392: ICD-10-CM

## 2025-06-06 DIAGNOSIS — H33.301: ICD-10-CM

## 2025-06-06 DIAGNOSIS — H43.822: ICD-10-CM

## 2025-06-06 DIAGNOSIS — Z96.1: ICD-10-CM

## 2025-06-06 DIAGNOSIS — H35.413: ICD-10-CM

## 2025-06-06 DIAGNOSIS — H35.373: ICD-10-CM

## 2025-06-06 PROCEDURE — 92014 COMPRE OPH EXAM EST PT 1/>: CPT

## 2025-06-06 PROCEDURE — 92134 CPTRZ OPH DX IMG PST SGM RTA: CPT

## 2025-06-06 PROCEDURE — 92201 OPSCPY EXTND RTA DRAW UNI/BI: CPT

## 2025-06-06 ASSESSMENT — TONOMETRY
OD_IOP_MMHG: 13
OS_IOP_MMHG: 15

## 2025-06-06 ASSESSMENT — VISUAL ACUITY
OS_SC: 20/20-1
OD_SC: 20/20